# Patient Record
Sex: FEMALE | Race: WHITE | NOT HISPANIC OR LATINO | Employment: FULL TIME | URBAN - METROPOLITAN AREA
[De-identification: names, ages, dates, MRNs, and addresses within clinical notes are randomized per-mention and may not be internally consistent; named-entity substitution may affect disease eponyms.]

---

## 2023-04-26 PROBLEM — F90.9 ADHD: Status: ACTIVE | Noted: 2023-04-26

## 2023-04-26 PROBLEM — B37.9 YEAST INFECTION: Status: ACTIVE | Noted: 2023-04-26

## 2023-04-26 PROBLEM — F41.9 ANXIETY: Status: ACTIVE | Noted: 2023-04-26

## 2023-04-26 PROBLEM — B07.0 PLANTAR WART: Status: ACTIVE | Noted: 2023-04-26

## 2023-04-26 PROBLEM — R42 DIZZINESS: Status: ACTIVE | Noted: 2023-04-26

## 2023-04-26 PROBLEM — N89.8 VAGINAL ITCHING: Status: ACTIVE | Noted: 2023-04-26

## 2023-04-26 PROBLEM — F31.9 BIPOLAR DEPRESSION (MULTI): Status: ACTIVE | Noted: 2023-04-26

## 2023-04-26 PROBLEM — E55.9 VITAMIN D DEFICIENCY: Status: ACTIVE | Noted: 2023-04-26

## 2023-04-26 PROBLEM — R30.0 DYSURIA: Status: ACTIVE | Noted: 2023-04-26

## 2023-04-26 PROBLEM — F17.200 NICOTINE DEPENDENCE: Status: ACTIVE | Noted: 2023-04-26

## 2023-04-26 PROBLEM — N89.8 VAGINAL DISCHARGE: Status: ACTIVE | Noted: 2023-04-26

## 2023-04-26 PROBLEM — K59.00 CONSTIPATION: Status: ACTIVE | Noted: 2023-04-26

## 2023-04-26 RX ORDER — POLYETHYLENE GLYCOL 3350 17 G/17G
17 POWDER, FOR SOLUTION ORAL DAILY
COMMUNITY
Start: 2022-03-29

## 2023-04-26 RX ORDER — VIT C/E/ZN/COPPR/LUTEIN/ZEAXAN 250MG-90MG
25 CAPSULE ORAL DAILY
COMMUNITY
Start: 2022-04-20

## 2023-10-27 ENCOUNTER — OFFICE VISIT (OUTPATIENT)
Dept: URGENT CARE | Facility: CLINIC | Age: 18
End: 2023-10-27
Payer: COMMERCIAL

## 2023-10-27 VITALS
BODY MASS INDEX: 21.69 KG/M2 | HEART RATE: 77 BPM | WEIGHT: 135 LBS | SYSTOLIC BLOOD PRESSURE: 110 MMHG | RESPIRATION RATE: 16 BRPM | OXYGEN SATURATION: 97 % | DIASTOLIC BLOOD PRESSURE: 70 MMHG | HEIGHT: 66 IN | TEMPERATURE: 98 F

## 2023-10-27 DIAGNOSIS — G44.209 ACUTE NON INTRACTABLE TENSION-TYPE HEADACHE: Primary | ICD-10-CM

## 2023-10-27 PROCEDURE — 99212 OFFICE O/P EST SF 10 MIN: CPT | Performed by: PHYSICIAN ASSISTANT

## 2023-10-27 NOTE — PROGRESS NOTES
[facility] URGENT CARE [place] NOTE:      Name: Joycelyn Ennis, 18 y.o.    CSN:[id number]   MRN:[id number]    PCP: VIRGILIO Mina-CNP    ALL:  No Known Allergies    History:    Chief Complaint: Headache (PT STATES MIGRAINE. )    Encounter Date: 10/27/2023  13:50hrs    HPI: The history was obtained from the patient. Joycelyn is a 18 y.o. female, who presents with a chief complaint of Headache (PT STATES MIGRAINE. ) post. Headache ongoing for 2d, improves with NSAID = 400mg ibuprofen & cool compresses, she missed school last two days & needs a school release.    PMHx:    Past Medical History:   Diagnosis Date    Personal history of diseases of the blood and blood-forming organs and certain disorders involving the immune mechanism     History of anemia      Past Medical History:   Diagnosis Date    Personal history of diseases of the blood and blood-forming organs and certain disorders involving the immune mechanism     History of anemia           Current Outpatient Medications   Medication Sig Dispense Refill    cholecalciferol (Vitamin D-3) 25 MCG (1000 UT) capsule Take 1 capsule (25 mcg) by mouth once daily.      levonorgestreL (KYLEENA) 17.5 mcg/24 hrs (5 yrs) 19.5 mg intrauterine device 19.5 mg by intrauterine route.      polyethylene glycol (Miralax) 17 gram/dose powder Take 17 g by mouth once daily. PRN AT BEDTIME       No current facility-administered medications for this visit.         PMSx:    Past Surgical History:   Procedure Laterality Date    OTHER SURGICAL HISTORY  04/19/2022    No history of surgery       Fam Hx:   Family History   Problem Relation Name Age of Onset    No Known Problems Mother      No Known Problems Father         SOC. Hx:     Social History     Socioeconomic History    Marital status: Single     Spouse name: Not on file    Number of children: Not on file    Years of education: Not on file    Highest education level: Not on file   Occupational History    Not on  file   Tobacco Use    Smoking status: Never    Smokeless tobacco: Never   Substance and Sexual Activity    Alcohol use: Never    Drug use: Yes     Types: Marijuana    Sexual activity: Not on file   Other Topics Concern    Not on file   Social History Narrative    Not on file     Social Determinants of Health     Financial Resource Strain: Not on file   Food Insecurity: Not on file   Transportation Needs: Not on file   Physical Activity: Not on file   Stress: Not on file   Social Connections: Not on file   Intimate Partner Violence: Not on file   Housing Stability: Not on file         Vitals:    10/27/23 1303   BP: 110/70   Pulse:    Resp:    Temp:    SpO2:      61.2 kg (135 lb)          Physical Exam  Constitutional:       Appearance: Normal appearance. She is normal weight.   HENT:      Head: Normocephalic and atraumatic.      Nose: Nose normal.      Mouth/Throat:      Mouth: Mucous membranes are moist.   Eyes:      General: No visual field deficit.     Extraocular Movements: Extraocular movements intact.   Cardiovascular:      Rate and Rhythm: Normal rate and regular rhythm.   Pulmonary:      Effort: Pulmonary effort is normal.      Breath sounds: Normal breath sounds.   Abdominal:      General: Abdomen is flat.   Musculoskeletal:      Right hand: No tenderness or bony tenderness.      Cervical back: Normal range of motion and neck supple.      Right knee: Normal.      Left ankle: No swelling. No tenderness. Normal range of motion.      Left foot: Normal range of motion. No tenderness.   Skin:     General: Skin is warm.   Neurological:      Mental Status: She is alert and oriented to person, place, and time. Mental status is at baseline.      Cranial Nerves: No cranial nerve deficit, dysarthria or facial asymmetry.      Sensory: No sensory deficit.      Motor: No weakness.      Gait: Gait normal.   Psychiatric:         Behavior: Behavior normal.           LABORATORY @ RADIOLOGICAL IMAGING (if done):     No results  found for this or any previous visit (from the past 24 hour(s)).     COURSE/MEDICAL DECISION MAKING:    Joycelyn is a 18 y.o., who presents with a working diagnosis of   1. Acute non intractable tension-type headache     with a differential to include: Tension headache, migraine, hemicrania, muscle spasm    Patient is a tension headache, will treat with anti-inflammatories over-the-counter, encouraged to push fluids, patient also encouraged apply ice or heat, she was given a note for work or school and discharge.  She has no additional meningeal symptoms and would benefit from continued conservative management.      Clinical Impression:  1. Acute non intractable tension-type headache        Frank Galloway PA-C   Advanced Practice Provider  [facility] URGENT CARE

## 2023-10-27 NOTE — PATIENT INSTRUCTIONS
"Are there different types of headaches?  Yes. There are different types of headaches. The most common types are:    ?Tension headaches - Tension headaches cause pressure or tightness on both sides of the head.    ?Migraine headaches - Migraine headaches often start mild and then get worse. They often affect just 1 side of the head. The pain often feels like it is pounding or throbbing. Routine activities like walking or climbing stairs can make the headache worse. Migraines can also cause nausea or vomiting, or make you sensitive to light and sound.    ?Cluster headaches - Cluster headaches affect 1 side of the head. They start quickly, happen frequently, and are very painful. They also cause symptoms on the same side of the face where the headache is. For example, you might get a droopy or watery eye, a stuffy nose, or facial sweating on 1 side.    ?Secondary headaches - Sometimes, a headache is related to another health problem. Doctors call this \"secondary headache.\" For example, infections, illnesses, or medicines can cause a headache. Your doctor or nurse will help figure out if your headache is related to another condition.    Is there anything I can do to feel better?  Yes. Some people feel better if they:    ?Take non-prescription pain medicines - Check with your doctor first if you have a health condition or already take prescription medicines.    ?Rest in a cool, dark, quiet room - This works best for migraine headaches. Some people find it helps to put a cold compress on their head or neck.    Should I see a doctor or nurse?  You should call for an ambulance (in the [place], call 9-1-1) if:    ?Your headache starts suddenly, quickly becomes severe, or could be described as \"the worst headache of your life.\"    ?You also have a seizure, personality changes, or confusion, or you pass out.    ?You have weakness, numbness, trouble speaking, or trouble seeing. (Migraine headaches can sometimes cause " "these symptoms, but you should be seen right away the first time that these symptoms happen.)    You should see a doctor or nurse if:    ?You get frequent or severe headaches.    ?Your headache began after you exercised or had a minor injury.    ?You have new headaches, especially if you are pregnant or older than 50.    ?You have a fever or stiff neck with your headache.    Depending on your symptoms, your doctor or nurse might want to do tests. This can help them figure out if your headache might be related to another health problem.    What might be causing my headaches?  Some people find that their headaches are triggered by certain foods or things they do. To get an idea of what might be causing your headaches, you can keep a \"headache diary.\" This involves writing down every time you have a headache and what you ate and did before it started.    Some common headache triggers include:    ?Stress    ?Skipping meals or eating too little    ?Having too little or too much caffeine    ?Sleeping too much or too little    ?Drinking alcohol    ?Certain foods or drinks    ?Not drinking enough water    You can also write down what medicine you took for the headache and whether or not it helped.    Is there anything I can do to keep from getting headaches?  If you know what things trigger your headache, avoid those things if possible. For example, it might help to:    ?Change your eating or sleeping patterns.    ?Learn relaxation techniques and healthy ways to manage stress.    ?Make healthy lifestyle changes, like quitting smoking and getting more physical activity.    If your headaches are frequent, severe, or long-lasting, your doctor can suggest ways to try to prevent them. In some cases, medicines can also help.    How are headaches treated?  There are lots of medicines that can ease the pain of headaches. You can try taking acetaminophen (sample brand name: Tylenol), ibuprofen (sample brand names: Advil, Motrin), or " naproxen (sample brand name: Aleve). There are prescription medicines that can help with pain, too.    The right treatment for you will depend on what type of headaches you get, how often you get them, and how bad they are. Some medicines are used to treat headaches, and others are taken every day to try to prevent headaches.    If you get headaches often, work with your doctor to find a treatment that helps. Do not try to manage frequent headaches on your own with non-prescription pain medicines. Taking these too often can actually cause more headaches later.

## 2023-10-27 NOTE — LETTER
October 27, 2023     Patient: Joycelyn Ennis   YOB: 2005   Date of Visit: 10/27/2023       To Whom it May Concern:    Joycelyn Ennis was seen in my clinic on 10/27/2023. She may return to school on 10/30/23 .    If you have any questions or concerns, please don't hesitate to call.         Sincerely,          Frank Galloway PA-C        CC: No Recipients

## 2024-06-24 ENCOUNTER — OFFICE VISIT (OUTPATIENT)
Dept: URGENT CARE | Facility: CLINIC | Age: 19
End: 2024-06-24
Payer: COMMERCIAL

## 2024-06-24 VITALS
DIASTOLIC BLOOD PRESSURE: 65 MMHG | OXYGEN SATURATION: 97 % | SYSTOLIC BLOOD PRESSURE: 115 MMHG | HEART RATE: 91 BPM | RESPIRATION RATE: 16 BRPM | HEIGHT: 65 IN | WEIGHT: 140 LBS | TEMPERATURE: 98.8 F | BODY MASS INDEX: 23.32 KG/M2

## 2024-06-24 DIAGNOSIS — J02.9 ACUTE PHARYNGITIS, UNSPECIFIED ETIOLOGY: ICD-10-CM

## 2024-06-24 DIAGNOSIS — R59.1 LYMPHADENOPATHY: Primary | ICD-10-CM

## 2024-06-24 PROCEDURE — 99212 OFFICE O/P EST SF 10 MIN: CPT

## 2024-06-24 RX ORDER — DOXYCYCLINE 100 MG/1
100 CAPSULE ORAL 2 TIMES DAILY
Qty: 20 CAPSULE | Refills: 0 | Status: SHIPPED | OUTPATIENT
Start: 2024-06-24 | End: 2024-07-04

## 2024-06-24 NOTE — PROGRESS NOTES
[facility] URGENT CARE [place] NOTE:      Name: Joycelyn Ennis, 19 y.o.    CSN:[id number]   MRN:[id number]    PCP: VIRGILIO Mina-CNP    ALL:  No Known Allergies    History:    Chief Complaint: Sore Throat (Pt states throat pain that causes issues swallowing. )    Encounter Date: 6/24/2024  1300    HPI: The history was obtained from the patient. Joycelyn is a 19 y.o. female, who presents with a chief complaint of Sore Throat (Pt states throat pain that causes issues swallowing. ) Joycelyn reports a 4 day history of sore throat, hoarseness, right sided neck pain, right sided facial pain, post nasal drip, dysphagia to solids and liquids and odynophagia. She describes the pain in her face as a dull ache. The pain and swelling in her neck is along the superior portion of the sternocleidomastoid. She has tried tylenol for the pain in her neck which has helped. She denies cough, fevers, shortness of breath, chest pain, abdominal pain, nausea, vomiting, diarrhea, dysuria, sinus pain, ear pain. She takes cetirizine for seasonal allergies and albuterol as needed for asthma. She denies sick contacts and does not wish to have a viral swab or strep swab. She does have cats at home.     PMHx:    Past Medical History:   Diagnosis Date    Personal history of diseases of the blood and blood-forming organs and certain disorders involving the immune mechanism     History of anemia              Current Outpatient Medications   Medication Sig Dispense Refill    cholecalciferol (Vitamin D-3) 25 MCG (1000 UT) capsule Take 1 capsule (25 mcg) by mouth once daily.      doxycycline (Monodox) 100 mg capsule Take 1 capsule (100 mg) by mouth 2 times a day for 10 days. Take with at least 8 ounces (large glass) of water, do not lie down for 30 minutes after 20 capsule 0    levonorgestreL (KYLEENA) 17.5 mcg/24 hrs (5 yrs) 19.5 mg intrauterine device 19.5 mg by intrauterine route.      polyethylene glycol (Miralax) 17 gram/dose  powder Take 17 g by mouth once daily. PRN AT BEDTIME       No current facility-administered medications for this visit.         PMSx:    Past Surgical History:   Procedure Laterality Date    OTHER SURGICAL HISTORY  04/19/2022    No history of surgery       Fam Hx:   Family History   Problem Relation Name Age of Onset    No Known Problems Mother      No Known Problems Father         SOC. Hx:     Social History     Socioeconomic History    Marital status: Single     Spouse name: Not on file    Number of children: Not on file    Years of education: Not on file    Highest education level: Not on file   Occupational History    Not on file   Tobacco Use    Smoking status: Never    Smokeless tobacco: Never   Substance and Sexual Activity    Alcohol use: Never    Drug use: Yes     Types: Marijuana    Sexual activity: Not on file   Other Topics Concern    Not on file   Social History Narrative    ** Merged History Encounter **          Social Determinants of Health     Financial Resource Strain: Not on file   Food Insecurity: Not on file   Transportation Needs: Not on file   Physical Activity: Not on file   Stress: Not on file   Social Connections: Not on file   Intimate Partner Violence: Not on file   Housing Stability: Not on file         Vitals:    06/24/24 1253   BP: 115/65   Pulse: 91   Resp: 16   Temp: 37.1 °C (98.8 °F)   SpO2: 97%     63.5 kg (140 lb)          Physical Exam  Constitutional:       General: She is not in acute distress.     Appearance: Normal appearance.   HENT:      Head: Normocephalic and atraumatic.      Right Ear: Tympanic membrane, ear canal and external ear normal.      Left Ear: Tympanic membrane, ear canal and external ear normal.      Nose: Nose normal. No congestion or rhinorrhea.      Mouth/Throat:      Mouth: Mucous membranes are moist.      Dentition: No dental abscesses.      Pharynx: Oropharynx is clear. No oropharyngeal exudate or posterior oropharyngeal erythema.      Tonsils: No  tonsillar exudate or tonsillar abscesses.   Eyes:      Extraocular Movements: Extraocular movements intact.      Conjunctiva/sclera: Conjunctivae normal.      Pupils: Pupils are equal, round, and reactive to light.   Neck:      Thyroid: No thyroid mass, thyromegaly or thyroid tenderness.        Comments: Right sided submandibular lymphadenopathy with tenderness to light palpation.  Cardiovascular:      Rate and Rhythm: Normal rate and regular rhythm.      Pulses: Normal pulses.      Heart sounds: Normal heart sounds.   Pulmonary:      Effort: Pulmonary effort is normal.      Breath sounds: Normal breath sounds.   Abdominal:      General: Abdomen is flat. Bowel sounds are normal.      Palpations: Abdomen is soft.   Musculoskeletal:      Cervical back: Normal range of motion and neck supple. Tenderness present. No rigidity.   Skin:     General: Skin is warm and dry.      Capillary Refill: Capillary refill takes less than 2 seconds.   Neurological:      General: No focal deficit present.      Mental Status: She is alert and oriented to person, place, and time.   Psychiatric:         Mood and Affect: Mood normal.         Behavior: Behavior normal.           ____________________________________________________________________    I did personally review Joycelyn's past medical history, surgical history, social history, as well as family history (when relevant).  In this case, I also oversaw the her drug management by reviewing her medication list, allergy list, as well as the medications that I prescribed during the UC course and/or recommended as an out-patient (including possible OTC medications such as acetaminophen, NSAIDs , etc).    After reviewing the items above, I did look at previous medical documentation, such as recent hospitalizations, office visits, and/or recent consultations with PCP/specialist.                          SDOH:   Another factor that I considered in Joycelyn's care was her Social Determinants of  Health (SDOH). During this UC encounter, she did not have social determinants of health. Those SDOH influencing Joycelyn's care are: none      UC COURSE/MEDICAL DECISION MAKING:    Joycelyn is a 19 y.o., who presents with a working diagnosis of   1. Lymphadenopathy    2. Acute pharyngitis, unspecified etiology     with a differential to include: viral pharyngitis, cat scratch fever (Bartonella henselae), strep pharyngitis, rhinosinusitis, mononucleosis, peritonsillar abscess, otitis media, subacute thyroiditis, thyroid nodule    Right sided tender submandibular lymphadenopathy and facial pain with a sore throat is most likely viral. Because she has cats at home and unilateral adenopathy, B. Henselae infection was also considered. She should apply warm compresses to the neck and take 600 mg ibuprofen as needed for pain. Doxycycline will be sent to pharmacy to take if symptoms do not improve in the next few days.     There were no findings on exam to suggest strep pharyngitis, dental abscess, peritonsillar abscess or mononucleosis. Her pharynx was symmetric with no lesions, exudate, erythema or abscess. Her ears were normal without erythema, bulging TM or fluid. Thyroiditis may cause a unilateral neck pain, however; her pain is submandibular and palpation of the thyroid was normal without nodules, tenderness or thyromegaly.     Note initiated by:  MAC Francois-Student, [facility]     Supervised by  Frank Galloway PA-C   Advanced Practice Provider  [facility] URGENT CARE    I was present with the PA student who participated in the documentation of this note. I have personally seen and re-examined the patient and performed the medical decision-making components (assessment and plan of care). I have reviewed the PA student documentation and verified the findings in the note as written with additions or exceptions as stated in the body of this note.    Frank Galloway PA-C

## 2025-01-28 ENCOUNTER — APPOINTMENT (OUTPATIENT)
Age: 20
End: 2025-01-28
Payer: COMMERCIAL

## 2025-01-28 VITALS
HEART RATE: 75 BPM | SYSTOLIC BLOOD PRESSURE: 120 MMHG | HEIGHT: 65 IN | BODY MASS INDEX: 21.92 KG/M2 | WEIGHT: 131.56 LBS | DIASTOLIC BLOOD PRESSURE: 75 MMHG

## 2025-01-28 DIAGNOSIS — R10.32 LEFT LOWER QUADRANT ABDOMINAL PAIN: Primary | ICD-10-CM

## 2025-01-28 DIAGNOSIS — F17.200 NICOTINE DEPENDENCE, UNCOMPLICATED, UNSPECIFIED NICOTINE PRODUCT TYPE: ICD-10-CM

## 2025-01-28 DIAGNOSIS — R10.10 PAIN OF UPPER ABDOMEN: ICD-10-CM

## 2025-01-28 DIAGNOSIS — R19.8 CHANGE IN BOWEL MOVEMENT: ICD-10-CM

## 2025-01-28 DIAGNOSIS — R93.5 ABNORMAL CT OF THE ABDOMEN: ICD-10-CM

## 2025-01-28 PROBLEM — F64.9 GENDER DYSPHORIA: Status: RESOLVED | Noted: 2020-06-14 | Resolved: 2025-01-28

## 2025-01-28 PROBLEM — Z86.2 HISTORY OF ANEMIA: Status: RESOLVED | Noted: 2025-01-28 | Resolved: 2025-01-28

## 2025-01-28 PROBLEM — G47.00 INSOMNIA: Status: RESOLVED | Noted: 2025-01-28 | Resolved: 2025-01-28

## 2025-01-28 PROBLEM — R42 DIZZINESS: Status: RESOLVED | Noted: 2023-04-26 | Resolved: 2025-01-28

## 2025-01-28 PROBLEM — Z91.51 PERSONAL HISTORY OF SUICIDAL BEHAVIOR: Status: RESOLVED | Noted: 2023-04-19 | Resolved: 2025-01-28

## 2025-01-28 PROBLEM — F19.91 HISTORY OF RECREATIONAL DRUG USE: Status: RESOLVED | Noted: 2023-09-23 | Resolved: 2025-01-28

## 2025-01-28 PROBLEM — N89.8 VAGINAL DISCHARGE: Status: RESOLVED | Noted: 2023-04-26 | Resolved: 2025-01-28

## 2025-01-28 PROBLEM — N89.8 VAGINAL ITCHING: Status: RESOLVED | Noted: 2023-04-26 | Resolved: 2025-01-28

## 2025-01-28 PROBLEM — F12.90 CANNABIS USE, UNCOMPLICATED: Status: RESOLVED | Noted: 2025-01-28 | Resolved: 2025-01-28

## 2025-01-28 PROBLEM — F19.90 SUBSTANCE USE: Status: RESOLVED | Noted: 2020-11-18 | Resolved: 2025-01-28

## 2025-01-28 PROBLEM — F33.1 MODERATE EPISODE OF RECURRENT MAJOR DEPRESSIVE DISORDER: Status: RESOLVED | Noted: 2019-10-30 | Resolved: 2025-01-28

## 2025-01-28 PROBLEM — R30.0 DYSURIA: Status: RESOLVED | Noted: 2023-04-26 | Resolved: 2025-01-28

## 2025-01-28 PROBLEM — F43.10 POST TRAUMATIC STRESS DISORDER (PTSD): Status: RESOLVED | Noted: 2019-04-17 | Resolved: 2025-01-28

## 2025-01-28 PROBLEM — B37.9 YEAST INFECTION: Status: RESOLVED | Noted: 2023-04-26 | Resolved: 2025-01-28

## 2025-01-28 PROBLEM — B07.0 PLANTAR WART: Status: RESOLVED | Noted: 2023-04-26 | Resolved: 2025-01-28

## 2025-01-28 PROBLEM — F14.91 HISTORY OF COCAINE USE: Status: RESOLVED | Noted: 2025-01-28 | Resolved: 2025-01-28

## 2025-01-28 PROCEDURE — 99214 OFFICE O/P EST MOD 30 MIN: CPT | Performed by: NURSE PRACTITIONER

## 2025-01-28 PROCEDURE — 1036F TOBACCO NON-USER: CPT | Performed by: NURSE PRACTITIONER

## 2025-01-28 PROCEDURE — 3008F BODY MASS INDEX DOCD: CPT | Performed by: NURSE PRACTITIONER

## 2025-01-28 RX ORDER — IBUPROFEN 200 MG
1 TABLET ORAL EVERY 24 HOURS
Qty: 30 PATCH | Refills: 0 | Status: SHIPPED | OUTPATIENT
Start: 2025-01-28 | End: 2025-02-27

## 2025-01-28 RX ORDER — NICOTINE 7MG/24HR
1 PATCH, TRANSDERMAL 24 HOURS TRANSDERMAL EVERY 24 HOURS
Qty: 14 PATCH | Refills: 0 | Status: SHIPPED | OUTPATIENT
Start: 2025-01-28 | End: 2025-02-11

## 2025-01-28 ASSESSMENT — ENCOUNTER SYMPTOMS
ABDOMINAL PAIN: 1
ABDOMINAL DISTENTION: 0
MUSCULOSKELETAL NEGATIVE: 1
BLOOD IN STOOL: 0
VOMITING: 0
CONSTIPATION: 0
DIARRHEA: 1
CARDIOVASCULAR NEGATIVE: 1
HEMATOLOGIC/LYMPHATIC NEGATIVE: 1
PSYCHIATRIC NEGATIVE: 1
NAUSEA: 1

## 2025-01-28 ASSESSMENT — PATIENT HEALTH QUESTIONNAIRE - PHQ9
SUM OF ALL RESPONSES TO PHQ9 QUESTIONS 1 AND 2: 2
2. FEELING DOWN, DEPRESSED OR HOPELESS: SEVERAL DAYS
1. LITTLE INTEREST OR PLEASURE IN DOING THINGS: SEVERAL DAYS

## 2025-01-28 NOTE — PROGRESS NOTES
"Subjective   Patient ID: Joycelyn Ennis is a 19 y.o. female who presents for GI Problem.    HPI   Joycelyn returns for abdominal pain, nausea that has been going on for about a month. She reports she was seen in December at [facility] and had a CT abdomen where they saw an intussusception of her small bowel, but felt it was fine and sent her home. She is now here with pain after she eats, and change in her bowels with going everyday, sometimes several times a day. Her white blood cell count was elevated last month as well. We will get updated labs.     Uses nicotine vape pen 5% nicotine cartridge every 2-3 days. Also smokes cigarettes when she drinks, but she \"doesn't do that all the time\". Would like to try nicotine patches to try and stop vaping. Tried them before and it helped, would like to see if she can do it again.     Review of Systems   HENT: Negative.     Cardiovascular: Negative.    Gastrointestinal:  Positive for abdominal pain, diarrhea and nausea. Negative for abdominal distention, blood in stool, constipation and vomiting.   Musculoskeletal: Negative.    Skin: Negative.    Hematological: Negative.    Psychiatric/Behavioral: Negative.         Objective   /75   Pulse 75   Ht 1.651 m (5' 5\")   Wt 59.7 kg (131 lb 9 oz)   BMI 21.89 kg/m²     Physical Exam  Vitals and nursing note reviewed.   Constitutional:       Appearance: Normal appearance.   HENT:      Head: Normocephalic and atraumatic.   Cardiovascular:      Rate and Rhythm: Normal rate and regular rhythm.      Pulses: Normal pulses.      Heart sounds: Normal heart sounds.   Pulmonary:      Effort: Pulmonary effort is normal.      Breath sounds: Normal breath sounds.   Abdominal:      General: Abdomen is flat. Bowel sounds are normal. There is no distension.      Palpations: Abdomen is soft.      Tenderness: There is abdominal tenderness in the left upper quadrant and left lower quadrant. There is no right CVA tenderness or left CVA " tenderness.       Skin:     General: Skin is warm and dry.   Neurological:      General: No focal deficit present.      Mental Status: She is alert and oriented to person, place, and time.   Psychiatric:         Mood and Affect: Mood normal.         Behavior: Behavior normal.         Thought Content: Thought content normal.         Judgment: Judgment normal.         Assessment/Plan   Problem List Items Addressed This Visit             ICD-10-CM    Nicotine dependence F17.200    Relevant Medications    nicotine (Nicoderm CQ) 14 mg/24 hr patch    nicotine (Nicoderm CQ) 7 mg/24 hr patch    BMI 21.0-21.9, adult Z68.21     Other Visit Diagnoses         Codes    Left lower quadrant abdominal pain    -  Primary R10.32    Relevant Orders    CT abdomen pelvis w IV contrast    CBC and Auto Differential    Comprehensive Metabolic Panel    Pain of upper abdomen     R10.10    Relevant Orders    CT abdomen pelvis w IV contrast    CBC and Auto Differential    Comprehensive Metabolic Panel    Change in bowel movement     R19.8    Relevant Orders    CT abdomen pelvis w IV contrast    Abnormal CT of the abdomen     R93.5    Relevant Orders    CT abdomen pelvis w IV contrast             Follow up after CT and lab work. Return precautions discussed. If we cannot get a CT will order an US.     For any new medications that were prescribed today, the patient was educated about their indications for use, administration, frequency and potential side effects of the medication.

## 2025-02-10 ENCOUNTER — HOSPITAL ENCOUNTER (OUTPATIENT)
Dept: RADIOLOGY | Facility: HOSPITAL | Age: 20
Discharge: HOME | End: 2025-02-10
Payer: COMMERCIAL

## 2025-02-10 DIAGNOSIS — R10.10 PAIN OF UPPER ABDOMEN: ICD-10-CM

## 2025-02-10 DIAGNOSIS — R19.8 CHANGE IN BOWEL MOVEMENT: ICD-10-CM

## 2025-02-10 DIAGNOSIS — R10.32 LEFT LOWER QUADRANT ABDOMINAL PAIN: ICD-10-CM

## 2025-02-10 DIAGNOSIS — R93.5 ABNORMAL CT OF THE ABDOMEN: ICD-10-CM

## 2025-02-10 PROCEDURE — 74177 CT ABD & PELVIS W/CONTRAST: CPT

## 2025-02-10 PROCEDURE — 2550000001 HC RX 255 CONTRASTS: Performed by: NURSE PRACTITIONER

## 2025-02-10 RX ADMIN — IOHEXOL 73 ML: 350 INJECTION, SOLUTION INTRAVENOUS at 14:22

## 2025-02-11 LAB
ALBUMIN SERPL-MCNC: 4.3 G/DL (ref 3.6–5.1)
ALP SERPL-CCNC: 49 U/L (ref 31–125)
ALT SERPL-CCNC: 9 U/L (ref 6–29)
ANION GAP SERPL CALCULATED.4IONS-SCNC: 6 MMOL/L (CALC) (ref 7–17)
AST SERPL-CCNC: 12 U/L (ref 10–30)
BASOPHILS # BLD AUTO: 22 CELLS/UL (ref 0–200)
BASOPHILS NFR BLD AUTO: 0.4 %
BILIRUB SERPL-MCNC: 0.4 MG/DL (ref 0.2–1.2)
BUN SERPL-MCNC: 15 MG/DL (ref 7–25)
CALCIUM SERPL-MCNC: 8.9 MG/DL (ref 8.6–10.2)
CHLORIDE SERPL-SCNC: 106 MMOL/L (ref 98–110)
CO2 SERPL-SCNC: 25 MMOL/L (ref 20–32)
CREAT SERPL-MCNC: 0.69 MG/DL (ref 0.5–0.96)
EGFRCR SERPLBLD CKD-EPI 2021: 127 ML/MIN/1.73M2
EOSINOPHIL # BLD AUTO: 119 CELLS/UL (ref 15–500)
EOSINOPHIL NFR BLD AUTO: 2.2 %
ERYTHROCYTE [DISTWIDTH] IN BLOOD BY AUTOMATED COUNT: 12.1 % (ref 11–15)
GLUCOSE SERPL-MCNC: 92 MG/DL (ref 65–99)
HCT VFR BLD AUTO: 39.3 % (ref 35–45)
HGB BLD-MCNC: 13 G/DL (ref 11.7–15.5)
LYMPHOCYTES # BLD AUTO: 2176 CELLS/UL (ref 850–3900)
LYMPHOCYTES NFR BLD AUTO: 40.3 %
MCH RBC QN AUTO: 29.7 PG (ref 27–33)
MCHC RBC AUTO-ENTMCNC: 33.1 G/DL (ref 32–36)
MCV RBC AUTO: 89.7 FL (ref 80–100)
MONOCYTES # BLD AUTO: 405 CELLS/UL (ref 200–950)
MONOCYTES NFR BLD AUTO: 7.5 %
NEUTROPHILS # BLD AUTO: 2678 CELLS/UL (ref 1500–7800)
NEUTROPHILS NFR BLD AUTO: 49.6 %
PLATELET # BLD AUTO: 236 THOUSAND/UL (ref 140–400)
PMV BLD REES-ECKER: 10.7 FL (ref 7.5–12.5)
POTASSIUM SERPL-SCNC: 4.1 MMOL/L (ref 3.5–5.3)
PROT SERPL-MCNC: 6.5 G/DL (ref 6.1–8.1)
RBC # BLD AUTO: 4.38 MILLION/UL (ref 3.8–5.1)
SODIUM SERPL-SCNC: 137 MMOL/L (ref 135–146)
WBC # BLD AUTO: 5.4 THOUSAND/UL (ref 3.8–10.8)